# Patient Record
Sex: MALE | Race: WHITE | NOT HISPANIC OR LATINO | Employment: OTHER | URBAN - METROPOLITAN AREA
[De-identification: names, ages, dates, MRNs, and addresses within clinical notes are randomized per-mention and may not be internally consistent; named-entity substitution may affect disease eponyms.]

---

## 2017-01-24 ENCOUNTER — OFFICE VISIT (OUTPATIENT)
Dept: INTERNAL MEDICINE | Facility: CLINIC | Age: 32
End: 2017-01-24

## 2017-01-24 VITALS
BODY MASS INDEX: 25.61 KG/M2 | DIASTOLIC BLOOD PRESSURE: 64 MMHG | HEIGHT: 68 IN | WEIGHT: 169 LBS | SYSTOLIC BLOOD PRESSURE: 100 MMHG | HEART RATE: 60 BPM

## 2017-01-24 DIAGNOSIS — K59.04 CHRONIC IDIOPATHIC CONSTIPATION: Primary | ICD-10-CM

## 2017-01-24 DIAGNOSIS — G47.00 INSOMNIA, UNSPECIFIED TYPE: ICD-10-CM

## 2017-01-24 DIAGNOSIS — G40.909 SEIZURE DISORDER (HCC): ICD-10-CM

## 2017-01-24 DIAGNOSIS — F31.30 BIPOLAR I DISORDER, MOST RECENT EPISODE DEPRESSED (HCC): ICD-10-CM

## 2017-01-24 DIAGNOSIS — E03.8 OTHER SPECIFIED HYPOTHYROIDISM: ICD-10-CM

## 2017-01-24 DIAGNOSIS — F32.89 OTHER DEPRESSION: ICD-10-CM

## 2017-01-24 LAB
ALBUMIN SERPL-MCNC: 4.2 G/DL (ref 3.2–4.8)
ALBUMIN/GLOB SERPL: 1.2 G/DL (ref 1.5–2.5)
ALP SERPL-CCNC: 46 U/L (ref 25–100)
ALT SERPL W P-5'-P-CCNC: 12 U/L (ref 7–40)
ANION GAP SERPL CALCULATED.3IONS-SCNC: 4 MMOL/L (ref 3–11)
AST SERPL-CCNC: 18 U/L (ref 0–33)
BILIRUB SERPL-MCNC: 0.4 MG/DL (ref 0.3–1.2)
BUN BLD-MCNC: 14 MG/DL (ref 9–23)
BUN/CREAT SERPL: 14 (ref 7–25)
CALCIUM SPEC-SCNC: 10 MG/DL (ref 8.7–10.4)
CHLORIDE SERPL-SCNC: 103 MMOL/L (ref 99–109)
CO2 SERPL-SCNC: 30 MMOL/L (ref 20–31)
CREAT BLD-MCNC: 1 MG/DL (ref 0.6–1.3)
DEPRECATED RDW RBC AUTO: 37.7 FL (ref 37–54)
ERYTHROCYTE [DISTWIDTH] IN BLOOD BY AUTOMATED COUNT: 12.5 % (ref 11.3–14.5)
GFR SERPL CREATININE-BSD FRML MDRD: 88 ML/MIN/1.73
GLOBULIN UR ELPH-MCNC: 3.6 GM/DL
GLUCOSE BLD-MCNC: 97 MG/DL (ref 70–100)
HCT VFR BLD AUTO: 40.8 % (ref 38.9–50.9)
HGB BLD-MCNC: 14 G/DL (ref 13.1–17.5)
MCH RBC QN AUTO: 28.7 PG (ref 27–31)
MCHC RBC AUTO-ENTMCNC: 34.3 G/DL (ref 32–36)
MCV RBC AUTO: 83.6 FL (ref 80–99)
PLATELET # BLD AUTO: 195 10*3/MM3 (ref 150–450)
PMV BLD AUTO: 9.3 FL (ref 6–12)
POTASSIUM BLD-SCNC: 5.6 MMOL/L (ref 3.5–5.5)
PROT SERPL-MCNC: 7.8 G/DL (ref 5.7–8.2)
RBC # BLD AUTO: 4.88 10*6/MM3 (ref 4.2–5.76)
SODIUM BLD-SCNC: 137 MMOL/L (ref 132–146)
TSH SERPL DL<=0.05 MIU/L-ACNC: 3.25 MIU/ML (ref 0.35–5.35)
VALPROATE SERPL-MCNC: 99 MCG/ML (ref 50–150)
WBC NRBC COR # BLD: 4.66 10*3/MM3 (ref 3.5–10.8)

## 2017-01-24 PROCEDURE — 80053 COMPREHEN METABOLIC PANEL: CPT | Performed by: INTERNAL MEDICINE

## 2017-01-24 PROCEDURE — 85027 COMPLETE CBC AUTOMATED: CPT | Performed by: INTERNAL MEDICINE

## 2017-01-24 PROCEDURE — 36415 COLL VENOUS BLD VENIPUNCTURE: CPT | Performed by: INTERNAL MEDICINE

## 2017-01-24 PROCEDURE — 80164 ASSAY DIPROPYLACETIC ACD TOT: CPT | Performed by: INTERNAL MEDICINE

## 2017-01-24 PROCEDURE — 99214 OFFICE O/P EST MOD 30 MIN: CPT | Performed by: INTERNAL MEDICINE

## 2017-01-24 PROCEDURE — 84443 ASSAY THYROID STIM HORMONE: CPT | Performed by: INTERNAL MEDICINE

## 2017-01-24 NOTE — PROGRESS NOTES
Patient is a 31 y.o. male who is here for a follow up of chronic conditions.  Chief Complaint   Patient presents with   • Hypothyroidism         HPI:  Here for f/u.  Having problems with paranoid.  Has not been followed up with psychiatry.  Energy level is low.  Not sleeping well.  Appetite is not the best.  Unsure of what meds he is on.  Still living in supported living.     History:    Patient Active Problem List   Diagnosis   • Bipolar I disorder, most recent episode depressed   • Constipation   • Depression   • Hypothyroidism   • Insomnia   • Seizure disorder   • Skin ulcer   • Glaucoma       Past Medical History   Diagnosis Date   • Glaucoma        Past Surgical History   Procedure Laterality Date   • No past surgeries         Current Outpatient Prescriptions on File Prior to Visit   Medication Sig   • benztropine (COGENTIN) 0.5 MG tablet Take 1 tablet by mouth 2 (two) times a day.   • divalproex (DEPAKOTE) 500 MG 24 hr tablet Take 2 tablets by mouth daily.   •  MG capsule TAKE ONE CAPSULE BY MOUTH TWICE DAILY   • FLUoxetine (PROzac) 20 MG capsule    • FLUoxetine (PROzac) 60 MG tablet Take 1 tablet by mouth daily.   • haloperidol (HALDOL) 5 MG tablet Take 1 tablet by mouth every night.   • INVEGA TRINZA 546 MG/1.75ML suspension IM injection    • levothyroxine (SYNTHROID, LEVOTHROID) 25 MCG tablet TAKE ONE TABLET BY MOUTH EVERY MORNING   • methylcellulose oral powder Take 2 application by mouth Daily.   • mupirocin (BACTROBAN) 2 % ointment Apply  topically 2 (two) times a day. APPLY SPARINGLY TO AFFECTED AREA(S) TWICE DAILY.   • traZODone (DESYREL) 50 MG tablet TAKE ONE TABLET NIGHTLY AT BEDTIME AS NEEDED FOR SLEEP     No current facility-administered medications on file prior to visit.        Family History   Problem Relation Age of Onset   • No Known Problems Mother        Social History     Social History   • Marital status: Single     Spouse name: N/A   • Number of children: N/A   • Years of  "education: N/A     Occupational History   • Not on file.     Social History Main Topics   • Smoking status: Never Smoker   • Smokeless tobacco: Not on file   • Alcohol use Not on file   • Drug use: Not on file   • Sexual activity: Not on file     Other Topics Concern   • Not on file     Social History Narrative         ROS:    Review of Systems   Constitutional: Positive for fatigue. Negative for chills, diaphoresis, fever and unexpected weight change.   HENT: Negative for congestion, ear pain, hearing loss, nosebleeds, postnasal drip, sinus pressure and sore throat.    Eyes: Negative for pain, discharge and itching.   Respiratory: Negative for cough, chest tightness, shortness of breath and wheezing.    Cardiovascular: Negative for chest pain, palpitations and leg swelling.   Gastrointestinal: Negative for abdominal distention, abdominal pain, blood in stool, constipation, diarrhea, nausea and vomiting.   Endocrine: Negative for polydipsia and polyuria.   Genitourinary: Negative for difficulty urinating, dysuria, frequency and hematuria.   Musculoskeletal: Negative for arthralgias, back pain, gait problem, joint swelling and myalgias.   Skin: Positive for wound. Negative for rash.   Neurological: Negative for dizziness, syncope, weakness and headaches.   Psychiatric/Behavioral: Positive for behavioral problems, confusion, decreased concentration, dysphoric mood, hallucinations and sleep disturbance. The patient is not nervous/anxious.        Visit Vitals   • /64   • Pulse 60   • Ht 68\" (172.7 cm)   • Wt 169 lb (76.7 kg)   • BMI 25.7 kg/m2       Physical Exam:    Physical Exam   Constitutional: He is oriented to person, place, and time. He appears well-developed and well-nourished.   HENT:   Head: Normocephalic and atraumatic.   Right Ear: External ear normal.   Left Ear: External ear normal.   Mouth/Throat: Oropharynx is clear and moist.   Eyes: Conjunctivae and EOM are normal.   Neck: Normal range of motion. " Neck supple.   Cardiovascular: Normal rate, regular rhythm and normal heart sounds.    Pulmonary/Chest: Effort normal and breath sounds normal.   Abdominal: Soft. Bowel sounds are normal.   Musculoskeletal: Normal range of motion.   Lymphadenopathy:     He has no cervical adenopathy.   Neurological: He is alert and oriented to person, place, and time.   Skin: Skin is warm and dry.   Psychiatric: He has a normal mood and affect. His behavior is normal. Thought content normal.       Procedure:      Discussion/Summary:  hypothyroid-tsh today  bipolar-cont current meds  constipation-advised fiber and fruit, cont colace and citrucel  sz-stable  high risk meds-labs today  ?ocd-to dw psychiatry change to zoloft and to consider decreasing prozac      labs noted and dw patient, Has already had flu shot          Current Outpatient Prescriptions:   •  benztropine (COGENTIN) 0.5 MG tablet, Take 1 tablet by mouth 2 (two) times a day., Disp: , Rfl:   •  divalproex (DEPAKOTE) 500 MG 24 hr tablet, Take 2 tablets by mouth daily., Disp: , Rfl:   •   MG capsule, TAKE ONE CAPSULE BY MOUTH TWICE DAILY, Disp: 180 capsule, Rfl: 2  •  FLUoxetine (PROzac) 20 MG capsule, , Disp: , Rfl:   •  FLUoxetine (PROzac) 60 MG tablet, Take 1 tablet by mouth daily., Disp: , Rfl:   •  haloperidol (HALDOL) 5 MG tablet, Take 1 tablet by mouth every night., Disp: , Rfl:   •  INVEGA TRINZA 546 MG/1.75ML suspension IM injection, , Disp: , Rfl:   •  levothyroxine (SYNTHROID, LEVOTHROID) 25 MCG tablet, TAKE ONE TABLET BY MOUTH EVERY MORNING, Disp: 30 tablet, Rfl: 10  •  methylcellulose oral powder, Take 2 application by mouth Daily., Disp: 1418 g, Rfl: 5  •  mupirocin (BACTROBAN) 2 % ointment, Apply  topically 2 (two) times a day. APPLY SPARINGLY TO AFFECTED AREA(S) TWICE DAILY., Disp: , Rfl:   •  traZODone (DESYREL) 50 MG tablet, TAKE ONE TABLET NIGHTLY AT BEDTIME AS NEEDED FOR SLEEP, Disp: 30 tablet, Rfl: 10        Дмитрий was seen today for  hypothyroidism.    Diagnoses and all orders for this visit:    Chronic idiopathic constipation    Other specified hypothyroidism  -     TSH    Seizure disorder    Bipolar I disorder, most recent episode depressed  -     Comprehensive Metabolic Panel  -     Valproic Acid Level, Total    Other depression  -     CBC (No Diff)    Insomnia, unspecified type

## 2017-01-24 NOTE — MR AVS SNAPSHOT
Дмитрий Jamesjimena   1/24/2017 11:30 AM   Office Visit    Dept Phone:  [phone]   Encounter #:  [id number]    Provider:  Jacob Yates MD   Department:  [facility]                Your Full Care Plan              Your Updated Medication List          This list is accurate as of: 1/24/17 12:38 PM.  Always use your most recent med list.                benztropine 0.5 MG tablet   Commonly known as:  COGENTIN       divalproex 500 MG 24 hr tablet   Commonly known as:  DEPAKOTE        MG capsule   Generic drug:  docusate sodium   TAKE ONE CAPSULE BY MOUTH TWICE DAILY       * FLUoxetine 60 MG tablet   Commonly known as:  PROzac       * FLUoxetine 20 MG capsule   Commonly known as:  PROzac       haloperidol 5 MG tablet   Commonly known as:  HALDOL       INVEGA TRINZA 546 MG/1.75ML suspension IM injection   Generic drug:  Paliperidone Palmitate       levothyroxine 25 MCG tablet   Commonly known as:  SYNTHROID, LEVOTHROID   TAKE ONE TABLET BY MOUTH EVERY MORNING       methylcellulose oral powder   Take 2 application by mouth Daily.       mupirocin 2 % ointment   Commonly known as:  BACTROBAN       traZODone 50 MG tablet   Commonly known as:  DESYREL   TAKE ONE TABLET NIGHTLY AT BEDTIME AS NEEDED FOR SLEEP       * Notice:  This list has 2 medication(s) that are the same as other medications prescribed for you. Read the directions carefully, and ask your doctor or other care provider to review them with you.            We Performed the Following     CBC (No Diff)     Comprehensive Metabolic Panel     TSH     Valproic Acid Level, Total       You Were Diagnosed With        Codes Comments    Chronic idiopathic constipation    -  Primary ICD-10-CM: K59.04  ICD-9-CM: 564.00     Other specified hypothyroidism     ICD-10-CM: E03.8  ICD-9-CM: 244.8     Seizure disorder     ICD-10-CM: G40.909  ICD-9-CM: 345.90     Bipolar I disorder, most recent episode depressed      "ICD-10-CM: F31.30  ICD-9-CM: 296.50     Other depression     ICD-10-CM: F32.89     Insomnia, unspecified type     ICD-10-CM: G47.00  ICD-9-CM: 780.52       Instructions     None    Patient Instructions History      Upcoming Appointments     Visit Type Date Time Department    FOLLOW UP 1/24/2017 11:30 AM [facility]    FOLLOW UP 4/24/2017 10:45 AM [facility]  [facility]      MyChart Signup     Our records indicate that your [facility] account has been deactivated. If you would like to reactivate your account, please email [email or web address] or call [phone] to talk to our MEEP staff.             Other Info from Your Visit           Your Appointments     Apr 24, 2017 10:45 AM EDT   Follow Up with Jacob Yates MD   [facility] (--)    [address] [address] [phone]           Arrive 15 minutes prior to appointment.              Allergies     No Known Allergies      Reason for Visit     Hypothyroidism           Vital Signs     Blood Pressure Pulse Height Weight Body Mass Index Smoking Status    100/64 60 68\" (172.7 cm) 169 lb (76.7 kg) 25.7 kg/m2 Never Smoker      Problems and Diagnoses Noted     Bipolar I disorder, most recent episode depressed    Constipation    Depression    Underactive thyroid    Difficulty falling or staying asleep    Seizure disorder      Results         "

## 2017-04-24 ENCOUNTER — OFFICE VISIT (OUTPATIENT)
Dept: INTERNAL MEDICINE | Facility: CLINIC | Age: 32
End: 2017-04-24

## 2017-04-24 VITALS
HEART RATE: 68 BPM | WEIGHT: 159 LBS | BODY MASS INDEX: 24.1 KG/M2 | SYSTOLIC BLOOD PRESSURE: 110 MMHG | DIASTOLIC BLOOD PRESSURE: 68 MMHG | HEIGHT: 68 IN

## 2017-04-24 DIAGNOSIS — F32.89 OTHER DEPRESSION: ICD-10-CM

## 2017-04-24 DIAGNOSIS — F31.30 BIPOLAR I DISORDER, MOST RECENT EPISODE DEPRESSED (HCC): ICD-10-CM

## 2017-04-24 DIAGNOSIS — E03.8 OTHER SPECIFIED HYPOTHYROIDISM: ICD-10-CM

## 2017-04-24 DIAGNOSIS — K59.04 CHRONIC IDIOPATHIC CONSTIPATION: Primary | ICD-10-CM

## 2017-04-24 DIAGNOSIS — G40.909 SEIZURE DISORDER (HCC): ICD-10-CM

## 2017-04-24 DIAGNOSIS — G47.00 INSOMNIA, UNSPECIFIED TYPE: ICD-10-CM

## 2017-04-24 PROCEDURE — 99214 OFFICE O/P EST MOD 30 MIN: CPT | Performed by: INTERNAL MEDICINE

## 2017-04-24 NOTE — PROGRESS NOTES
Patient is a 31 y.o. male who is here for a follow up of chronic conditions.  Chief Complaint   Patient presents with   • Hypothyroidism         HPI:  Here for f/u.  Doing ok but slightly depressed.  Energy level is sluggish.  No HA's.  No sz.  Sleep is good.  Continues to be followed at [facility].  Constipation is lessened.      History:    Patient Active Problem List   Diagnosis   • Bipolar I disorder, most recent episode depressed   • Constipation   • Depression   • Hypothyroidism   • Insomnia   • Seizure disorder   • Skin ulcer   • Glaucoma       Past Medical History:   Diagnosis Date   • Glaucoma        Past Surgical History:   Procedure Laterality Date   • NO PAST SURGERIES         Current Outpatient Prescriptions on File Prior to Visit   Medication Sig   • benztropine (COGENTIN) 0.5 MG tablet Take 1 tablet by mouth 2 (two) times a day.   • divalproex (DEPAKOTE) 500 MG 24 hr tablet Take 2 tablets by mouth daily.   •  MG capsule TAKE ONE CAPSULE BY MOUTH TWICE DAILY   • FLUoxetine (PROzac) 60 MG tablet Take 1 tablet by mouth daily.   • haloperidol (HALDOL) 5 MG tablet Take 1 tablet by mouth every night.   • INVEGA TRINZA 546 MG/1.75ML suspension IM injection    • levothyroxine (SYNTHROID, LEVOTHROID) 25 MCG tablet TAKE ONE TABLET BY MOUTH EVERY MORNING   • methylcellulose oral powder Take 2 application by mouth Daily.   • traZODone (DESYREL) 50 MG tablet TAKE ONE TABLET NIGHTLY AT BEDTIME AS NEEDED FOR SLEEP   • mupirocin (BACTROBAN) 2 % ointment Apply  topically 2 (two) times a day. APPLY SPARINGLY TO AFFECTED AREA(S) TWICE DAILY.   • [DISCONTINUED] FLUoxetine (PROzac) 20 MG capsule      No current facility-administered medications on file prior to visit.        Family History   Problem Relation Age of Onset   • No Known Problems Mother        Social History     Social History   • Marital status: Single     Spouse name: N/A   • Number of children: N/A   • Years of education: N/A     Occupational History   •  "Not on file.     Social History Main Topics   • Smoking status: Never Smoker   • Smokeless tobacco: Not on file   • Alcohol use Not on file   • Drug use: Not on file   • Sexual activity: Not on file     Other Topics Concern   • Not on file     Social History Narrative         ROS:    Review of Systems   Constitutional: Positive for fatigue. Negative for chills, diaphoresis, fever and unexpected weight change.   HENT: Negative for congestion, ear pain, hearing loss, nosebleeds, postnasal drip, sinus pressure and sore throat.    Eyes: Negative for pain, discharge and itching.   Respiratory: Negative for cough, chest tightness, shortness of breath and wheezing.    Cardiovascular: Negative for chest pain, palpitations and leg swelling.   Gastrointestinal: Negative for abdominal distention, abdominal pain, blood in stool, constipation, diarrhea, nausea and vomiting.   Endocrine: Negative for polydipsia and polyuria.   Genitourinary: Negative for difficulty urinating, dysuria, frequency and hematuria.   Musculoskeletal: Negative for arthralgias, back pain, gait problem, joint swelling and myalgias.   Skin: Negative for rash and wound.   Neurological: Negative for dizziness, syncope, weakness and headaches.   Psychiatric/Behavioral: Positive for behavioral problems, confusion, decreased concentration, dysphoric mood, hallucinations and sleep disturbance. The patient is not nervous/anxious.        /68 (BP Location: Left arm, Patient Position: Sitting)  Pulse 68  Ht 68\" (172.7 cm)  Wt 159 lb (72.1 kg)  BMI 24.18 kg/m2    Physical Exam:    Physical Exam   Constitutional: He is oriented to person, place, and time. He appears well-developed and well-nourished.   HENT:   Head: Normocephalic and atraumatic.   Right Ear: External ear normal.   Left Ear: External ear normal.   Mouth/Throat: Oropharynx is clear and moist.   Eyes: Conjunctivae and EOM are normal.   Neck: Normal range of motion. Neck supple. "   Cardiovascular: Normal rate, regular rhythm and normal heart sounds.    Pulmonary/Chest: Effort normal and breath sounds normal.   Abdominal: Soft. Bowel sounds are normal.   Musculoskeletal: Normal range of motion.   Lymphadenopathy:     He has no cervical adenopathy.   Neurological: He is alert and oriented to person, place, and time.   Skin: Skin is warm and dry.   Psychiatric: He has a normal mood and affect. His behavior is normal. Thought content normal.       Procedure:      Discussion/Summary:  hypothyroid-tsh noted  bipolar-cont current meds  constipation-advised fiber and fruit, cont colace and citrucel  sz-stable  high risk meds-labs noted  ?ocd-to dw psychiatry change to zoloft and to consider decreasing prozac      labs noted and dw patient, Has already had flu shot      Current Outpatient Prescriptions:   •  benztropine (COGENTIN) 0.5 MG tablet, Take 1 tablet by mouth 2 (two) times a day., Disp: , Rfl:   •  divalproex (DEPAKOTE) 500 MG 24 hr tablet, Take 2 tablets by mouth daily., Disp: , Rfl:   •   MG capsule, TAKE ONE CAPSULE BY MOUTH TWICE DAILY, Disp: 180 capsule, Rfl: 2  •  FLUoxetine (PROzac) 60 MG tablet, Take 1 tablet by mouth daily., Disp: , Rfl:   •  haloperidol (HALDOL) 5 MG tablet, Take 1 tablet by mouth every night., Disp: , Rfl:   •  INVEGA TRINZA 546 MG/1.75ML suspension IM injection, , Disp: , Rfl:   •  levothyroxine (SYNTHROID, LEVOTHROID) 25 MCG tablet, TAKE ONE TABLET BY MOUTH EVERY MORNING, Disp: 30 tablet, Rfl: 10  •  methylcellulose oral powder, Take 2 application by mouth Daily., Disp: 1418 g, Rfl: 5  •  traZODone (DESYREL) 50 MG tablet, TAKE ONE TABLET NIGHTLY AT BEDTIME AS NEEDED FOR SLEEP, Disp: 30 tablet, Rfl: 10  •  mupirocin (BACTROBAN) 2 % ointment, Apply  topically 2 (two) times a day. APPLY SPARINGLY TO AFFECTED AREA(S) TWICE DAILY., Disp: , Rfl:         Дмитрий was seen today for hypothyroidism.    Diagnoses and all orders for this visit:    Chronic idiopathic  constipation    Other specified hypothyroidism    Seizure disorder    Bipolar I disorder, most recent episode depressed    Other depression    Insomnia, unspecified type